# Patient Record
Sex: FEMALE | Race: WHITE | NOT HISPANIC OR LATINO | Employment: PART TIME | URBAN - METROPOLITAN AREA
[De-identification: names, ages, dates, MRNs, and addresses within clinical notes are randomized per-mention and may not be internally consistent; named-entity substitution may affect disease eponyms.]

---

## 2018-02-15 ENCOUNTER — OFFICE VISIT (OUTPATIENT)
Dept: GASTROENTEROLOGY | Facility: MEDICAL CENTER | Age: 23
End: 2018-02-15
Payer: COMMERCIAL

## 2018-02-15 VITALS
RESPIRATION RATE: 18 BRPM | TEMPERATURE: 98.2 F | HEART RATE: 98 BPM | HEIGHT: 63 IN | WEIGHT: 135.6 LBS | BODY MASS INDEX: 24.03 KG/M2 | DIASTOLIC BLOOD PRESSURE: 62 MMHG | SYSTOLIC BLOOD PRESSURE: 110 MMHG

## 2018-02-15 DIAGNOSIS — K58.1 IRRITABLE BOWEL SYNDROME WITH CONSTIPATION: Primary | ICD-10-CM

## 2018-02-15 PROCEDURE — 99204 OFFICE O/P NEW MOD 45 MIN: CPT | Performed by: INTERNAL MEDICINE

## 2018-02-15 RX ORDER — DIVALPROEX SODIUM 250 MG/1
250 TABLET, EXTENDED RELEASE ORAL DAILY
COMMUNITY
Start: 2017-10-20

## 2018-02-15 RX ORDER — BUSPIRONE HYDROCHLORIDE 10 MG/1
10 TABLET ORAL 2 TIMES DAILY
COMMUNITY
Start: 2018-01-09

## 2018-02-15 RX ORDER — CLONIDINE HYDROCHLORIDE 0.1 MG/1
0.1 TABLET ORAL
COMMUNITY
Start: 2018-01-29

## 2018-02-15 RX ORDER — LINACLOTIDE 72 UG/1
72 CAPSULE, GELATIN COATED ORAL DAILY
COMMUNITY
Start: 2018-01-28 | End: 2018-06-27 | Stop reason: CLARIF

## 2018-02-15 RX ORDER — ALBUTEROL SULFATE 90 UG/1
1 AEROSOL, METERED RESPIRATORY (INHALATION) AS NEEDED
COMMUNITY

## 2018-02-15 RX ORDER — LORAZEPAM 0.5 MG/1
0.5 TABLET ORAL AS NEEDED
COMMUNITY
Start: 2017-12-07

## 2018-02-15 RX ORDER — DESVENLAFAXINE 50 MG/1
50 TABLET, EXTENDED RELEASE ORAL DAILY
COMMUNITY
Start: 2017-10-20 | End: 2018-10-20

## 2018-02-15 NOTE — PROGRESS NOTES
[facility] - Outpatient Consultation  Renelle Camby 25 y o  female MRN: [id number]  Encounter: [id number]          ASSESSMENT AND PLAN:    25year old female self referred with IBS-C for many years  Is here for a second opinion  1  Irritable bowel syndrome with constipation  -given that she is not happy with Linzess we will give her a trial of lactulose  -will also check labs as she hasn't had labs recently     Follow up in a few months time    ______________________________________________________________________    HPI:  25year old self referred  She has been seen by [facility] in the past but wasn't happy and is here for a second opinion  She has long standing IBS- with constipation  She has been on amitiza in the past     Was on linzess 145 but this worked to well and now is on 72 mcg dose but feels that it sometimes works too well  She has not had blood work recently  She denies a family history of colon cancer  She reports one prior colonoscopy in 2015 which the pt states was normal except for small hemorrhoids  REVIEW OF SYSTEMS:    CONSTITUTIONAL: Denies any fever, chills, rigors, and weight loss  HEENT: No earache or tinnitus  Denies hearing loss or visual disturbances  CARDIOVASCULAR: No chest pain or palpitations  RESPIRATORY: Denies any cough, hemoptysis, shortness of breath or dyspnea on exertion  GASTROINTESTINAL: As noted in the History of Present Illness  GENITOURINARY: No problems with urination  Denies any hematuria or dysuria  NEUROLOGIC: No dizziness or vertigo, denies headaches  MUSCULOSKELETAL: Denies any muscle or joint pain  SKIN: Denies skin rashes or itching  ENDOCRINE: Denies excessive thirst  Denies intolerance to heat or cold  PSYCHOSOCIAL: Denies depression or anxiety  Denies any recent memory loss         Historical Information   Past Medical History:   Diagnosis Date    Irritable bowel syndrome     diagnosed 7-8 years ago     Past Surgical History:   Procedure Laterality Date    COLONOSCOPY      UPPER GASTROINTESTINAL ENDOSCOPY       Social History   History   Alcohol Use    Yes     Comment: occasional     History   Drug Use No     History   Smoking Status    Never Smoker   Smokeless Tobacco    Never Used     History reviewed  No pertinent family history  Meds/Allergies       Current Outpatient Prescriptions:     albuterol (PROVENTIL HFA,VENTOLIN HFA) 90 mcg/act inhaler    busPIRone (BUSPAR) 10 mg tablet    cloNIDine (CATAPRES) 0 1 mg tablet    desvenlafaxine succinate (PRISTIQ) 50 mg 24 hr tablet    divalproex sodium (DEPAKOTE ER) 250 mg 24 hr tablet    LINZESS 72 MCG CAPS    LORazepam (ATIVAN) 0 5 mg tablet    Allergies   Allergen Reactions    Cat Hair Extract     Latex     Pollen Extract            Objective     Blood pressure 110/62, pulse 98, temperature 98 2 °F (36 8 °C), temperature source Oral, resp  rate 18, height 5' 3" (1 6 m), weight 61 5 kg (135 lb 9 6 oz)  PHYSICAL EXAM:      General Appearance:   Alert, cooperative, no distress   HEENT:   Normocephalic, atraumatic, anicteric      Neck:  Supple, symmetrical, trachea midline   Lungs:   Clear to auscultation bilaterally; no rales, rhonchi or wheezing; respirations unlabored    Heart[de-identified]   Regular rate and rhythm; no murmur, rub, or gallop  Abdomen:   Soft, non-tender, non-distended; normal bowel sounds; no masses, no organomegaly    Genitalia:   Deferred    Rectal:   Deferred    Extremities:  No cyanosis, clubbing or edema    Pulses:  2+ and symmetric    Skin:  No jaundice, rashes, or lesions    Lymph nodes:  No palpable cervical lymphadenopathy        Lab Results:   No visits with results within 1 Day(s) from this visit  Latest known visit with results is:   No results found for any previous visit  Radiology Results:   No results found

## 2018-02-15 NOTE — LETTER
February 15, 2018     Referral [address] [facility] [id number]    Patient: Emilee Cochran   YOB: 1995   Date of Visit: 2/15/2018       Dear Dr Ga Arrington:    Thank you for referring Emilee Cochran to me for evaluation  Below are my notes for this consultation  If you have questions, please do not hesitate to call me  I look forward to following your patient along with you  Sincerely,        Eros Salazar DO        CC: No Recipients  Eros Salazar DO  2/15/2018  6:40 PM  Sign at close encounter  [facility] - Outpatient Consultation  Emilee Cochran 25 y o  female MRN: [id number]  Encounter: [id number]          ASSESSMENT AND PLAN:    25year old female self referred with IBS-C for many years  Is here for a second opinion  1  Irritable bowel syndrome with constipation  -given that she is not happy with Linzess we will give her a trial of lactulose  -will also check labs as she hasn't had labs recently     Follow up in a few months time    ______________________________________________________________________    HPI:  25year old self referred  She has been seen by [facility] in the past but wasn't happy and is here for a second opinion  She has long standing IBS- with constipation  She has been on amitiza in the past     Was on linzess 145 but this worked to well and now is on 72 mcg dose but feels that it sometimes works too well  She has not had blood work recently  She denies a family history of colon cancer  She reports one prior colonoscopy in 2015 which the pt states was normal except for small hemorrhoids  REVIEW OF SYSTEMS:    CONSTITUTIONAL: Denies any fever, chills, rigors, and weight loss  HEENT: No earache or tinnitus  Denies hearing loss or visual disturbances  CARDIOVASCULAR: No chest pain or palpitations     RESPIRATORY: Denies any cough, hemoptysis, shortness of breath or dyspnea on exertion  GASTROINTESTINAL: As noted in the History of Present Illness  GENITOURINARY: No problems with urination  Denies any hematuria or dysuria  NEUROLOGIC: No dizziness or vertigo, denies headaches  MUSCULOSKELETAL: Denies any muscle or joint pain  SKIN: Denies skin rashes or itching  ENDOCRINE: Denies excessive thirst  Denies intolerance to heat or cold  PSYCHOSOCIAL: Denies depression or anxiety  Denies any recent memory loss  Historical Information   Past Medical History:   Diagnosis Date    Irritable bowel syndrome     diagnosed 7-8 years ago     Past Surgical History:   Procedure Laterality Date    COLONOSCOPY      UPPER GASTROINTESTINAL ENDOSCOPY       Social History   History   Alcohol Use    Yes     Comment: occasional     History   Drug Use No     History   Smoking Status    Never Smoker   Smokeless Tobacco    Never Used     History reviewed  No pertinent family history  Meds/Allergies       Current Outpatient Prescriptions:     albuterol (PROVENTIL HFA,VENTOLIN HFA) 90 mcg/act inhaler    busPIRone (BUSPAR) 10 mg tablet    cloNIDine (CATAPRES) 0 1 mg tablet    desvenlafaxine succinate (PRISTIQ) 50 mg 24 hr tablet    divalproex sodium (DEPAKOTE ER) 250 mg 24 hr tablet    LINZESS 72 MCG CAPS    LORazepam (ATIVAN) 0 5 mg tablet    Allergies   Allergen Reactions    Cat Hair Extract     Latex     Pollen Extract            Objective     Blood pressure 110/62, pulse 98, temperature 98 2 °F (36 8 °C), temperature source Oral, resp  rate 18, height 5' 3" (1 6 m), weight 61 5 kg (135 lb 9 6 oz)  PHYSICAL EXAM:      General Appearance:   Alert, cooperative, no distress   HEENT:   Normocephalic, atraumatic, anicteric      Neck:  Supple, symmetrical, trachea midline   Lungs:   Clear to auscultation bilaterally; no rales, rhonchi or wheezing; respirations unlabored    Heart[de-identified]   Regular rate and rhythm; no murmur, rub, or gallop     Abdomen:   Soft, non-tender, non-distended; normal bowel sounds; no masses, no organomegaly    Genitalia:   Deferred    Rectal:   Deferred    Extremities:  No cyanosis, clubbing or edema    Pulses:  2+ and symmetric    Skin:  No jaundice, rashes, or lesions    Lymph nodes:  No palpable cervical lymphadenopathy        Lab Results:   No visits with results within 1 Day(s) from this visit  Latest known visit with results is:   No results found for any previous visit  Radiology Results:   No results found

## 2018-05-22 LAB — HBA1C MFR BLD HPLC: 5.7 %

## 2018-06-27 ENCOUNTER — OFFICE VISIT (OUTPATIENT)
Dept: GASTROENTEROLOGY | Facility: MEDICAL CENTER | Age: 23
End: 2018-06-27
Payer: COMMERCIAL

## 2018-06-27 VITALS
SYSTOLIC BLOOD PRESSURE: 110 MMHG | TEMPERATURE: 98.7 F | DIASTOLIC BLOOD PRESSURE: 68 MMHG | WEIGHT: 135.8 LBS | BODY MASS INDEX: 24.06 KG/M2 | HEART RATE: 77 BPM

## 2018-06-27 DIAGNOSIS — K58.1 IRRITABLE BOWEL SYNDROME WITH CONSTIPATION: Primary | ICD-10-CM

## 2018-06-27 PROCEDURE — 99213 OFFICE O/P EST LOW 20 MIN: CPT | Performed by: INTERNAL MEDICINE

## 2018-06-27 RX ORDER — DOCUSATE SODIUM 100 MG/1
100 CAPSULE, LIQUID FILLED ORAL 2 TIMES DAILY
Qty: 60 CAPSULE | Refills: 5 | Status: SHIPPED | OUTPATIENT
Start: 2018-06-27

## 2018-06-27 NOTE — PROGRESS NOTES
[facility] - Outpatient Follow-up Note  Prem Vazquez 25 y o  female MRN: [id number]  Encounter: [id number]          ASSESSMENT AND PLAN:   25year old female here for follow up  1  Irritable bowel syndrome with constipation  - main issue is straining to have a BM; will give a trial of colace since she is having a BM every 3-4 days  Did not like the effects of linzess or lactulose    Follow up in a few months time       ____________________________________________________________    SUBJECTIVE: 25year old female here for follow up  She didn't like lactulose due to increased gas and it didn't really help with the constipation  The pt reports a BM every 3-4 days and is straining to have a bowel movement  REVIEW OF SYSTEMS IS OTHERWISE NEGATIVE  Historical Information   Past Medical History:   Diagnosis Date    Irritable bowel syndrome     diagnosed 7-8 years ago     Past Surgical History:   Procedure Laterality Date    COLONOSCOPY      UPPER GASTROINTESTINAL ENDOSCOPY       Social History   History   Alcohol Use    Yes     Comment: occasional     History   Drug Use No     History   Smoking Status    Never Smoker   Smokeless Tobacco    Never Used     History reviewed  No pertinent family history  Meds/Allergies       Current Outpatient Prescriptions:     albuterol (PROVENTIL HFA,VENTOLIN HFA) 90 mcg/act inhaler    busPIRone (BUSPAR) 10 mg tablet    cloNIDine (CATAPRES) 0 1 mg tablet    desvenlafaxine succinate (PRISTIQ) 50 mg 24 hr tablet    divalproex sodium (DEPAKOTE ER) 250 mg 24 hr tablet    LORazepam (ATIVAN) 0 5 mg tablet    lactulose (CEPHULAC) 20 g packet    LINZESS 72 MCG CAPS    Allergies   Allergen Reactions    Cat Hair Extract     Latex     Pollen Extract            Objective     Blood pressure 110/68, pulse 77, temperature 98 7 °F (37 1 °C), temperature source Tympanic, weight 61 6 kg (135 lb 12 8 oz)   Body mass index is 24 06 kg/m²       PHYSICAL EXAM:      General Appearance:   Alert, cooperative, no distress   HEENT:   Normocephalic, atraumatic, anicteric      Neck:  Supple, symmetrical, trachea midline   Lungs:   Clear to auscultation bilaterally; no rales, rhonchi or wheezing; respirations unlabored    Heart[de-identified]   Regular rate and rhythm; no murmur, rub, or gallop  Abdomen:   Soft, non-tender, non-distended; normal bowel sounds; no masses, no organomegaly    Genitalia:   Deferred    Rectal:   Deferred    Extremities:  No cyanosis, clubbing or edema    Pulses:  2+ and symmetric    Skin:  No jaundice, rashes, or lesions    Lymph nodes:  No palpable cervical lymphadenopathy        Lab Results:   No visits with results within 1 Day(s) from this visit  Latest known visit with results is:   No results found for any previous visit  Radiology Results:   No results found

## 2018-06-27 NOTE — LETTER
June 27, 2018     [phone]    Patient: Dalton Woods   YOB: 1995   Date of Visit: 6/27/2018       Dear Dr Shant Cornelius: Thank you for referring Dalton Woods to me for evaluation  Below are my notes for this consultation  If you have questions, please do not hesitate to call me  I look forward to following your patient along with you  Sincerely,        Elsie Maddox DO        CC: No Recipients  Elsie Maddox DO  6/27/2018 10:04 AM  Sign at close encounter  [facility] - Outpatient Follow-up Note  Dalton Woods 25 y o  female MRN: [id number]  Encounter: [id number]          ASSESSMENT AND PLAN:   25year old female here for follow up  1  Irritable bowel syndrome with constipation      ____________________________________________________________    SUBJECTIVE: 25year old female here for follow up  REVIEW OF SYSTEMS IS OTHERWISE NEGATIVE  Historical Information   Past Medical History:   Diagnosis Date    Irritable bowel syndrome     diagnosed 7-8 years ago     Past Surgical History:   Procedure Laterality Date    COLONOSCOPY      UPPER GASTROINTESTINAL ENDOSCOPY       Social History   History   Alcohol Use    Yes     Comment: occasional     History   Drug Use No     History   Smoking Status    Never Smoker   Smokeless Tobacco    Never Used     History reviewed  No pertinent family history      Meds/Allergies       Current Outpatient Prescriptions:     albuterol (PROVENTIL HFA,VENTOLIN HFA) 90 mcg/act inhaler    busPIRone (BUSPAR) 10 mg tablet    cloNIDine (CATAPRES) 0 1 mg tablet    desvenlafaxine succinate (PRISTIQ) 50 mg 24 hr tablet    divalproex sodium (DEPAKOTE ER) 250 mg 24 hr tablet    LORazepam (ATIVAN) 0 5 mg tablet    lactulose (CEPHULAC) 20 g packet    LINZESS 72 MCG CAPS    Allergies   Allergen Reactions    Cat Hair Extract     Latex     Pollen Extract            Objective     Blood pressure 110/68, pulse 77, temperature 98 7 °F (37 1 °C), temperature source Tympanic, weight 61 6 kg (135 lb 12 8 oz)  Body mass index is 24 06 kg/m²  PHYSICAL EXAM:      General Appearance:   Alert, cooperative, no distress   HEENT:   Normocephalic, atraumatic, anicteric      Neck:  Supple, symmetrical, trachea midline   Lungs:   Clear to auscultation bilaterally; no rales, rhonchi or wheezing; respirations unlabored    Heart[de-identified]   Regular rate and rhythm; no murmur, rub, or gallop  Abdomen:   Soft, non-tender, non-distended; normal bowel sounds; no masses, no organomegaly    Genitalia:   Deferred    Rectal:   Deferred    Extremities:  No cyanosis, clubbing or edema    Pulses:  2+ and symmetric    Skin:  No jaundice, rashes, or lesions    Lymph nodes:  No palpable cervical lymphadenopathy        Lab Results:   No visits with results within 1 Day(s) from this visit  Latest known visit with results is:   No results found for any previous visit  Radiology Results:   No results found

## 2023-03-01 ENCOUNTER — TELEPHONE (OUTPATIENT)
Dept: PSYCHIATRY | Facility: CLINIC | Age: 28
End: 2023-03-01

## 2023-11-07 ENCOUNTER — TELEPHONE (OUTPATIENT)
Dept: PSYCHIATRY | Facility: CLINIC | Age: 28
End: 2023-11-07

## 2023-11-07 NOTE — LETTER
Dear Padmini Méndez : We are contacting you because your name is currently included on the [address] [place] [address] wait-list for Talk Therapy and/or Medication Management. (Please [place] which services are needed)     In our efforts to provide the highest quality care, [facility] has begun the process of upgrading our behavioral health systems to increase efficiency and expedite delivery of services. As part of this process, we ask you to please confirm your continued interest in the services above. If you are no longer interested or in need, please zak “No” in the area below. If you are still interested and in need, please zak “Yes” and provide your most current demographic and insurance information within 15 days. If we do not receive confirmation from you by 2023 your information will not be included in the system upgrade and your place on the waitlist will be lost.     Thank you in advance for your patience and understanding and we apologize for any inconvenience this may cause. Patient Name and :    Still in need of services: Yes or No     Current Address:     Phone#:     Best time to receive a call: Insurance Carrier:      Policy/ID#: Group#: Insurance Services Phone#:      What is your current presenting problem? We will call you to do an Intake when an appointment becomes available. You can send this information back to us in any of the ways below:    Email: [email or web address]  Fax#:  [phone]  Mail:   [address] [place] [address]             [address] [facility], [address] [place] [address]